# Patient Record
Sex: FEMALE | Race: WHITE
[De-identification: names, ages, dates, MRNs, and addresses within clinical notes are randomized per-mention and may not be internally consistent; named-entity substitution may affect disease eponyms.]

---

## 2018-05-14 ENCOUNTER — HOSPITAL ENCOUNTER (OUTPATIENT)
Dept: HOSPITAL 93 - MAMO-SONO | Age: 58
Discharge: HOME | End: 2018-05-14
Attending: INTERNAL MEDICINE
Payer: COMMERCIAL

## 2018-05-14 DIAGNOSIS — E04.1: ICD-10-CM

## 2018-05-14 DIAGNOSIS — N64.89: ICD-10-CM

## 2018-05-14 DIAGNOSIS — Z12.31: Primary | ICD-10-CM

## 2018-05-15 ENCOUNTER — HOSPITAL ENCOUNTER (OUTPATIENT)
Dept: HOSPITAL 93 - SONOGRAMA | Age: 58
Discharge: HOME | End: 2018-05-15
Attending: INTERNAL MEDICINE
Payer: COMMERCIAL

## 2018-05-15 DIAGNOSIS — R10.13: Primary | ICD-10-CM

## 2019-03-15 ENCOUNTER — HOSPITAL ENCOUNTER (EMERGENCY)
Dept: HOSPITAL 93 - ER | Age: 59
Discharge: HOME | End: 2019-03-15
Payer: COMMERCIAL

## 2019-03-15 VITALS — HEIGHT: 64 IN | BODY MASS INDEX: 24.75 KG/M2 | WEIGHT: 145 LBS

## 2019-03-15 DIAGNOSIS — K52.9: Primary | ICD-10-CM

## 2019-03-18 ENCOUNTER — HOSPITAL ENCOUNTER (INPATIENT)
Dept: HOSPITAL 93 - ER | Age: 59
LOS: 3 days | Discharge: HOME | DRG: 392 | End: 2019-03-21
Attending: INTERNAL MEDICINE | Admitting: INTERNAL MEDICINE
Payer: COMMERCIAL

## 2019-03-18 VITALS — HEIGHT: 64 IN | BODY MASS INDEX: 24.75 KG/M2 | WEIGHT: 145 LBS

## 2019-03-18 DIAGNOSIS — K29.60: Primary | ICD-10-CM

## 2019-03-18 DIAGNOSIS — K52.89: ICD-10-CM

## 2019-03-18 NOTE — NUR
PACIENTE ALERTA Y ORIENTADA X3. REFIERE VENIR POR VOMITOS DESDE EL VIERNES.
PACIENTE REFIERE 6 EPISODIOS DE VOMITOS HOY. SE UBICA EN EL AREA DE OBSERVACION
PARA EVALUACION MEDICA.

## 2019-03-18 NOTE — NUR
SE ORIENTA A PT SOBRE ORDENES MEDICAS, REFIERE ENTENDER. SE LE COLECTAN
MUESTRAS DE LABORATORIO BAJO MEDIDAS ASEPTICAS Y SE CANALIZA EN MANO RT, AREA
DE VENOPUNCION BARAK DE EDEMA Y ERITEMA. SE LE ADMINISTRAN MEDICAMENTOS KEVYN
PESCRITOS, AL MOMENTO NO PRESENTA REACCION ADVERSA.

## 2019-03-21 PROCEDURE — 0DB78ZX EXCISION OF STOMACH, PYLORUS, VIA NATURAL OR ARTIFICIAL OPENING ENDOSCOPIC, DIAGNOSTIC: ICD-10-PCS | Performed by: INTERNAL MEDICINE

## 2019-03-21 PROCEDURE — 0DB98ZX EXCISION OF DUODENUM, VIA NATURAL OR ARTIFICIAL OPENING ENDOSCOPIC, DIAGNOSTIC: ICD-10-PCS | Performed by: INTERNAL MEDICINE

## 2019-03-22 ENCOUNTER — HOSPITAL ENCOUNTER (EMERGENCY)
Dept: HOSPITAL 93 - ER | Age: 59
Discharge: HOME | End: 2019-03-22
Payer: COMMERCIAL

## 2019-03-22 VITALS — WEIGHT: 145 LBS | BODY MASS INDEX: 24.75 KG/M2 | HEIGHT: 64 IN

## 2019-03-22 DIAGNOSIS — R42: Primary | ICD-10-CM

## 2019-03-27 ENCOUNTER — HOSPITAL ENCOUNTER (OUTPATIENT)
Dept: HOSPITAL 93 - NUCLEAR | Age: 59
Discharge: HOME | End: 2019-03-27
Attending: GENERAL PRACTICE
Payer: COMMERCIAL

## 2019-03-27 DIAGNOSIS — G45.9: Primary | ICD-10-CM

## 2019-11-04 ENCOUNTER — HOSPITAL ENCOUNTER (OUTPATIENT)
Dept: HOSPITAL 93 - MAMO-SONO | Age: 59
Discharge: HOME | End: 2019-11-04
Attending: OBSTETRICS & GYNECOLOGY
Payer: COMMERCIAL

## 2019-11-04 DIAGNOSIS — Z87.898: ICD-10-CM

## 2019-11-04 DIAGNOSIS — Z12.31: Primary | ICD-10-CM

## 2020-01-18 ENCOUNTER — HOSPITAL ENCOUNTER (EMERGENCY)
Dept: HOSPITAL 93 - ER | Age: 60
Discharge: HOME | End: 2020-01-18
Payer: COMMERCIAL

## 2020-01-18 VITALS — BODY MASS INDEX: 23.32 KG/M2 | HEIGHT: 65 IN | WEIGHT: 140 LBS

## 2020-01-18 DIAGNOSIS — K29.00: Primary | ICD-10-CM

## 2020-06-22 ENCOUNTER — HOSPITAL ENCOUNTER (OUTPATIENT)
Dept: HOSPITAL 93 - SONOGRAMA | Age: 60
Discharge: HOME | End: 2020-06-22
Attending: INTERNAL MEDICINE
Payer: COMMERCIAL

## 2020-06-22 DIAGNOSIS — E04.8: Primary | ICD-10-CM

## 2020-09-25 ENCOUNTER — HOSPITAL ENCOUNTER (OUTPATIENT)
Dept: HOSPITAL 93 - LAB | Age: 60
Discharge: HOME | End: 2020-09-25
Attending: INTERNAL MEDICINE
Payer: COMMERCIAL

## 2020-09-25 DIAGNOSIS — D64.89: Primary | ICD-10-CM

## 2020-09-25 DIAGNOSIS — N28.89: ICD-10-CM

## 2020-09-25 DIAGNOSIS — N39.0: ICD-10-CM

## 2020-09-25 DIAGNOSIS — R74.0: ICD-10-CM

## 2020-09-25 DIAGNOSIS — E03.8: ICD-10-CM

## 2020-09-25 DIAGNOSIS — E78.49: ICD-10-CM

## 2020-09-25 DIAGNOSIS — R73.09: ICD-10-CM

## 2021-03-16 ENCOUNTER — HOSPITAL ENCOUNTER (OUTPATIENT)
Dept: HOSPITAL 93 - MAMO-SONO | Age: 61
Discharge: HOME | End: 2021-03-16
Payer: COMMERCIAL

## 2021-03-16 DIAGNOSIS — N64.4: Primary | ICD-10-CM

## 2021-03-19 ENCOUNTER — HOSPITAL ENCOUNTER (EMERGENCY)
Dept: HOSPITAL 93 - ER | Age: 61
Discharge: LEFT BEFORE DISCHARGE | End: 2021-03-19
Payer: COMMERCIAL

## 2021-03-19 VITALS — HEIGHT: 64 IN | BODY MASS INDEX: 24.41 KG/M2 | WEIGHT: 143 LBS

## 2021-03-19 DIAGNOSIS — Z20.822: ICD-10-CM

## 2021-03-19 DIAGNOSIS — R11.2: Primary | ICD-10-CM

## 2021-03-19 DIAGNOSIS — N39.0: ICD-10-CM

## 2021-03-21 ENCOUNTER — HOSPITAL ENCOUNTER (EMERGENCY)
Dept: HOSPITAL 93 - ER | Age: 61
LOS: 1 days | Discharge: HOME | End: 2021-03-22
Payer: COMMERCIAL

## 2021-03-21 VITALS — HEIGHT: 64 IN | WEIGHT: 143 LBS | BODY MASS INDEX: 24.41 KG/M2

## 2021-03-21 DIAGNOSIS — R11.2: Primary | ICD-10-CM

## 2021-04-22 ENCOUNTER — HOSPITAL ENCOUNTER (OUTPATIENT)
Dept: HOSPITAL 93 - LAB | Age: 61
Discharge: HOME | End: 2021-04-22
Attending: RADIOLOGY
Payer: COMMERCIAL

## 2021-04-22 DIAGNOSIS — Z03.818: Primary | ICD-10-CM

## 2021-09-28 ENCOUNTER — HOSPITAL ENCOUNTER (OUTPATIENT)
Dept: HOSPITAL 93 - SONOGRAMA | Age: 61
Discharge: HOME | End: 2021-09-28
Attending: INTERNAL MEDICINE
Payer: COMMERCIAL

## 2021-09-28 DIAGNOSIS — R10.84: ICD-10-CM

## 2021-09-28 DIAGNOSIS — R16.0: ICD-10-CM

## 2021-09-28 DIAGNOSIS — E03.8: Primary | ICD-10-CM

## 2021-10-19 ENCOUNTER — HOSPITAL ENCOUNTER (OUTPATIENT)
Dept: HOSPITAL 93 - LAB | Age: 61
Discharge: HOME | End: 2021-10-19
Payer: COMMERCIAL

## 2021-10-19 DIAGNOSIS — E78.2: ICD-10-CM

## 2021-10-19 DIAGNOSIS — E11.9: ICD-10-CM

## 2021-10-19 DIAGNOSIS — R00.2: Primary | ICD-10-CM

## 2022-04-19 ENCOUNTER — HOSPITAL ENCOUNTER (OUTPATIENT)
Dept: HOSPITAL 93 - LAB | Age: 62
Discharge: HOME | End: 2022-04-19
Attending: INTERNAL MEDICINE
Payer: COMMERCIAL

## 2022-04-19 DIAGNOSIS — E11.9: ICD-10-CM

## 2022-04-19 DIAGNOSIS — E78.2: ICD-10-CM

## 2022-04-19 DIAGNOSIS — R00.2: Primary | ICD-10-CM

## 2022-04-19 DIAGNOSIS — I11.9: ICD-10-CM

## 2022-09-12 ENCOUNTER — HOSPITAL ENCOUNTER (OUTPATIENT)
Dept: HOSPITAL 93 - RAD | Age: 62
Discharge: HOME | End: 2022-09-12
Attending: PHYSICAL MEDICINE & REHABILITATION
Payer: COMMERCIAL

## 2022-09-12 DIAGNOSIS — M25.521: Primary | ICD-10-CM

## 2023-03-20 ENCOUNTER — HOSPITAL ENCOUNTER (INPATIENT)
Dept: HOSPITAL 93 - ER | Age: 63
LOS: 5 days | Discharge: LEFT BEFORE DISCHARGE | DRG: 280 | End: 2023-03-25
Attending: INTERNAL MEDICINE | Admitting: INTERNAL MEDICINE
Payer: COMMERCIAL

## 2023-03-20 VITALS — HEIGHT: 64 IN | WEIGHT: 141 LBS | BODY MASS INDEX: 24.07 KG/M2

## 2023-03-20 DIAGNOSIS — A48.0: ICD-10-CM

## 2023-03-20 DIAGNOSIS — I11.9: ICD-10-CM

## 2023-03-20 DIAGNOSIS — F12.20: ICD-10-CM

## 2023-03-20 DIAGNOSIS — K21.9: ICD-10-CM

## 2023-03-20 DIAGNOSIS — Z20.822: ICD-10-CM

## 2023-03-20 DIAGNOSIS — I21.A1: Primary | ICD-10-CM

## 2023-03-20 DIAGNOSIS — E78.5: ICD-10-CM

## 2023-03-20 PROCEDURE — B24BZZZ ULTRASONOGRAPHY OF HEART WITH AORTA: ICD-10-PCS | Performed by: INTERNAL MEDICINE

## 2023-03-21 PROCEDURE — 4A12X4Z MONITORING OF CARDIAC ELECTRICAL ACTIVITY, EXTERNAL APPROACH: ICD-10-PCS | Performed by: INTERNAL MEDICINE

## 2023-03-23 PROCEDURE — CF241ZZ TOMOGRAPHIC (TOMO) NUCLEAR MEDICINE IMAGING OF GALLBLADDER USING TECHNETIUM 99M (TC-99M): ICD-10-PCS | Performed by: INTERNAL MEDICINE

## 2023-04-20 ENCOUNTER — HOSPITAL ENCOUNTER (OUTPATIENT)
Dept: HOSPITAL 93 - TOM | Age: 63
Discharge: HOME | End: 2023-04-20
Attending: INTERNAL MEDICINE
Payer: COMMERCIAL

## 2023-04-20 DIAGNOSIS — I63.9: Primary | ICD-10-CM

## 2023-05-05 ENCOUNTER — HOSPITAL ENCOUNTER (OUTPATIENT)
Dept: HOSPITAL 93 - MAMO-SONO | Age: 63
Discharge: HOME | End: 2023-05-05
Payer: COMMERCIAL

## 2023-05-05 DIAGNOSIS — Z12.31: Primary | ICD-10-CM

## 2025-06-04 ENCOUNTER — HOSPITAL ENCOUNTER (OUTPATIENT)
Dept: HOSPITAL 93 - MAMO-SONO | Age: 65
Discharge: HOME | End: 2025-06-04
Attending: OBSTETRICS & GYNECOLOGY
Payer: COMMERCIAL

## 2025-06-04 DIAGNOSIS — Z12.31: ICD-10-CM

## 2025-06-04 DIAGNOSIS — N64.4: Primary | ICD-10-CM
